# Patient Record
Sex: FEMALE | Race: WHITE | Employment: FULL TIME | URBAN - NONMETROPOLITAN AREA
[De-identification: names, ages, dates, MRNs, and addresses within clinical notes are randomized per-mention and may not be internally consistent; named-entity substitution may affect disease eponyms.]

---

## 2021-07-08 ENCOUNTER — HOSPITAL ENCOUNTER (OUTPATIENT)
Dept: ULTRASOUND IMAGING | Age: 46
Discharge: HOME | End: 2021-07-08

## 2021-07-08 DIAGNOSIS — N85.9 DISORDER OF UTERUS: ICD-10-CM

## 2021-07-08 PROCEDURE — 76830 TRANSVAGINAL US NON-OB: CPT

## 2021-12-30 ENCOUNTER — HOSPITAL ENCOUNTER (OUTPATIENT)
Dept: ULTRASOUND IMAGING | Age: 46
Discharge: HOME | End: 2021-12-30
Payer: COMMERCIAL

## 2021-12-30 DIAGNOSIS — N85.8 UTERINE MASS: ICD-10-CM

## 2021-12-30 PROCEDURE — 76830 TRANSVAGINAL US NON-OB: CPT

## 2024-03-04 ENCOUNTER — OFFICE VISIT (OUTPATIENT)
Dept: ENT CLINIC | Age: 49
End: 2024-03-04
Payer: COMMERCIAL

## 2024-03-04 VITALS — DIASTOLIC BLOOD PRESSURE: 84 MMHG | WEIGHT: 153 LBS | SYSTOLIC BLOOD PRESSURE: 126 MMHG

## 2024-03-04 DIAGNOSIS — Z91.09 ENVIRONMENTAL ALLERGIES: Primary | ICD-10-CM

## 2024-03-04 PROCEDURE — 99203 OFFICE O/P NEW LOW 30 MIN: CPT | Performed by: NURSE PRACTITIONER

## 2024-03-04 RX ORDER — FLUTICASONE PROPIONATE 50 MCG
SPRAY, SUSPENSION (ML) NASAL
COMMUNITY
Start: 2024-02-15

## 2024-03-04 ASSESSMENT — ENCOUNTER SYMPTOMS
ALLERGIC/IMMUNOLOGIC NEGATIVE: 1
EYES NEGATIVE: 1
RESPIRATORY NEGATIVE: 1
GASTROINTESTINAL NEGATIVE: 1

## 2024-03-04 NOTE — PROGRESS NOTES
3/4/2024    Chastity Pepe (:  1975) is a 48 y.o. female, Established patient, here for evaluation of the following chief complaint(s):  New Patient (Sinus )      Vitals:    24 1532   BP: 126/84   Weight: 69.4 kg (153 lb)       Wt Readings from Last 3 Encounters:   24 69.4 kg (153 lb)       BP Readings from Last 3 Encounters:   24 126/84         SUBJECTIVE/OBJECTIVE:    Patient seen today for sinuses.  in room. She states that her PCP gave her a nasal spray 3 weeks ago and it resolved all of her symptoms she just wanted to be examined today to make sure everything was okay. She denies nasal congestion, sinus pressure, and drainage.        Review of Systems   Constitutional: Negative.    HENT: Negative.     Eyes: Negative.    Respiratory: Negative.     Cardiovascular: Negative.    Gastrointestinal: Negative.    Endocrine: Negative.    Musculoskeletal: Negative.    Skin: Negative.    Allergic/Immunologic: Negative.    Neurological: Negative.    Hematological: Negative.    Psychiatric/Behavioral: Negative.          Physical Exam  Vitals reviewed.   Constitutional:       Appearance: Normal appearance. She is normal weight.   HENT:      Head: Normocephalic and atraumatic.      Right Ear: Tympanic membrane, ear canal and external ear normal.      Left Ear: Tympanic membrane, ear canal and external ear normal.      Nose: Nose normal.      Mouth/Throat:      Mouth: Mucous membranes are moist.      Pharynx: Oropharynx is clear.   Eyes:      Extraocular Movements: Extraocular movements intact.      Pupils: Pupils are equal, round, and reactive to light.   Cardiovascular:      Rate and Rhythm: Normal rate and regular rhythm.   Pulmonary:      Effort: Pulmonary effort is normal.      Breath sounds: Normal breath sounds.   Musculoskeletal:      Cervical back: Normal range of motion.   Skin:     General: Skin is warm and dry.   Neurological:      General: No focal deficit present.      Mental

## 2024-12-29 ENCOUNTER — HOSPITAL ENCOUNTER (EMERGENCY)
Age: 49
Discharge: HOME | End: 2024-12-29
Payer: OTHER MISCELLANEOUS

## 2024-12-29 ENCOUNTER — APPOINTMENT (OUTPATIENT)
Dept: GENERAL RADIOLOGY | Age: 49
End: 2024-12-29
Payer: OTHER MISCELLANEOUS

## 2024-12-29 VITALS
HEART RATE: 74 BPM | RESPIRATION RATE: 16 BRPM | SYSTOLIC BLOOD PRESSURE: 144 MMHG | OXYGEN SATURATION: 97 % | TEMPERATURE: 97.8 F | WEIGHT: 145 LBS | DIASTOLIC BLOOD PRESSURE: 92 MMHG

## 2024-12-29 DIAGNOSIS — S92.351A CLOSED DISPLACED FRACTURE OF FIFTH METATARSAL BONE OF RIGHT FOOT, INITIAL ENCOUNTER: ICD-10-CM
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
## 2024-12-29 PROCEDURE — 99283 EMERGENCY DEPT VISIT LOW MDM: CPT

## 2024-12-29 PROCEDURE — 6370000000 HC RX 637 (ALT 250 FOR IP): Performed by: NURSE PRACTITIONER

## 2024-12-29 PROCEDURE — 73610 X-RAY EXAM OF ANKLE: CPT

## 2024-12-29 PROCEDURE — 73630 X-RAY EXAM OF FOOT: CPT

## 2024-12-29 RX ORDER — HYDROCODONE BITARTRATE AND ACETAMINOPHEN 5; 325 MG/1; MG/1
1 TABLET ORAL EVERY 6 HOURS PRN
Qty: 10 TABLET | Refills: 0 | Status: SHIPPED | OUTPATIENT
Start: 2024-12-29 | End: 2025-01-01

## 2024-12-29 RX ORDER — HYDROCODONE BITARTRATE AND ACETAMINOPHEN 7.5; 325 MG/1; MG/1
1 TABLET ORAL ONCE
Status: COMPLETED | OUTPATIENT
Start: 2024-12-29 | End: 2024-12-29

## 2024-12-29 RX ADMIN — HYDROCODONE BITARTRATE AND ACETAMINOPHEN 1 TABLET: 7.5; 325 TABLET ORAL at 19:32
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
## 2025-01-01 ENCOUNTER — APPOINTMENT (OUTPATIENT)
Dept: CT IMAGING | Age: 50
End: 2025-01-01
Payer: COMMERCIAL

## 2025-01-01 ENCOUNTER — APPOINTMENT (OUTPATIENT)
Dept: GENERAL RADIOLOGY | Age: 50
End: 2025-01-01
Payer: COMMERCIAL

## 2025-01-01 ENCOUNTER — HOSPITAL ENCOUNTER (EMERGENCY)
Age: 50
Discharge: HOME | End: 2025-01-01
Attending: EMERGENCY MEDICINE
Payer: COMMERCIAL

## 2025-01-01 VITALS
WEIGHT: 145 LBS | OXYGEN SATURATION: 99 % | DIASTOLIC BLOOD PRESSURE: 82 MMHG | HEART RATE: 55 BPM | TEMPERATURE: 97.6 F | SYSTOLIC BLOOD PRESSURE: 155 MMHG | RESPIRATION RATE: 17 BRPM

## 2025-01-01 DIAGNOSIS — S22.32XA CLOSED FRACTURE OF ONE RIB OF LEFT SIDE, INITIAL ENCOUNTER: Primary | ICD-10-CM

## 2025-01-01 LAB
ALBUMIN SERPL-MCNC: 4.3 G/DL (ref 3.5–5.2)
ALP SERPL-CCNC: 184 U/L (ref 35–104)
ALT SERPL-CCNC: 37 U/L (ref 5–33)
ANION GAP SERPL CALCULATED.3IONS-SCNC: 16 MMOL/L (ref 7–19)
AST SERPL-CCNC: 32 U/L (ref 5–32)
BASOPHILS # BLD: 0.1 K/UL (ref 0–0.2)
BASOPHILS NFR BLD: 1 % (ref 0–1)
BILIRUB SERPL-MCNC: 0.5 MG/DL (ref 0.2–1.2)
BUN SERPL-MCNC: 15 MG/DL (ref 6–20)
CALCIUM SERPL-MCNC: 9 MG/DL (ref 8.6–10)
CHLORIDE SERPL-SCNC: 98 MMOL/L (ref 98–111)
CO2 SERPL-SCNC: 21 MMOL/L (ref 22–29)
CREAT SERPL-MCNC: 0.6 MG/DL (ref 0.5–0.9)
EOSINOPHIL # BLD: 0.1 K/UL (ref 0–0.6)
EOSINOPHIL NFR BLD: 1.7 % (ref 0–5)
ERYTHROCYTE [DISTWIDTH] IN BLOOD BY AUTOMATED COUNT: 12.4 % (ref 11.5–14.5)
GLUCOSE SERPL-MCNC: 318 MG/DL (ref 70–99)
HCT VFR BLD AUTO: 41.3 % (ref 37–47)
HGB BLD-MCNC: 13.9 G/DL (ref 12–16)
IMM GRANULOCYTES # BLD: 0 K/UL
LYMPHOCYTES # BLD: 2.2 K/UL (ref 1.1–4.5)
LYMPHOCYTES NFR BLD: 32.3 % (ref 20–40)
MCH RBC QN AUTO: 30.2 PG (ref 27–31)
MCHC RBC AUTO-ENTMCNC: 33.7 G/DL (ref 33–37)
MCV RBC AUTO: 89.8 FL (ref 81–99)
MONOCYTES # BLD: 0.4 K/UL (ref 0–0.9)
MONOCYTES NFR BLD: 5.9 % (ref 0–10)
NEUTROPHILS # BLD: 4.1 K/UL (ref 1.5–7.5)
NEUTS SEG NFR BLD: 58.7 % (ref 50–65)
PLATELET # BLD AUTO: 174 K/UL (ref 130–400)
PMV BLD AUTO: 12.2 FL (ref 9.4–12.3)
POTASSIUM SERPL-SCNC: 4 MMOL/L (ref 3.5–5)
PROT SERPL-MCNC: 7.4 G/DL (ref 6.4–8.3)
RBC # BLD AUTO: 4.6 M/UL (ref 4.2–5.4)
SODIUM SERPL-SCNC: 135 MMOL/L (ref 136–145)
TROPONIN, HIGH SENSITIVITY: <6 NG/L (ref 0–14)
WBC # BLD AUTO: 6.9 K/UL (ref 4.8–10.8)

## 2025-01-01 PROCEDURE — 84484 ASSAY OF TROPONIN QUANT: CPT

## 2025-01-01 PROCEDURE — 71045 X-RAY EXAM CHEST 1 VIEW: CPT

## 2025-01-01 PROCEDURE — 93005 ELECTROCARDIOGRAM TRACING: CPT | Performed by: EMERGENCY MEDICINE

## 2025-01-01 PROCEDURE — 85025 COMPLETE CBC W/AUTO DIFF WBC: CPT

## 2025-01-01 PROCEDURE — 36415 COLL VENOUS BLD VENIPUNCTURE: CPT

## 2025-01-01 PROCEDURE — 99285 EMERGENCY DEPT VISIT HI MDM: CPT

## 2025-01-01 PROCEDURE — 80053 COMPREHEN METABOLIC PANEL: CPT

## 2025-01-01 PROCEDURE — 6370000000 HC RX 637 (ALT 250 FOR IP): Performed by: EMERGENCY MEDICINE

## 2025-01-01 PROCEDURE — 71250 CT THORAX DX C-: CPT

## 2025-01-01 RX ORDER — HYDROCODONE BITARTRATE AND ACETAMINOPHEN 5; 325 MG/1; MG/1
1 TABLET ORAL EVERY 6 HOURS PRN
Qty: 10 TABLET | Refills: 0 | Status: SHIPPED | OUTPATIENT
Start: 2025-01-01 | End: 2025-01-08

## 2025-01-01 RX ORDER — HYDROCODONE BITARTRATE AND ACETAMINOPHEN 7.5; 325 MG/1; MG/1
1 TABLET ORAL ONCE
Status: COMPLETED | OUTPATIENT
Start: 2025-01-01 | End: 2025-01-01

## 2025-01-01 RX ADMIN — HYDROCODONE BITARTRATE AND ACETAMINOPHEN 1 TABLET: 7.5; 325 TABLET ORAL at 16:18

## 2025-01-01 ASSESSMENT — PAIN SCALES - GENERAL: PAINLEVEL_OUTOF10: 7

## 2025-01-01 NOTE — ED PROVIDER NOTES
[facility] EMERGENCY DEPT  eMERGENCY dEPARTMENT eNCOUnter      Pt Name: Chastity Pepe  MRN: [id number]  Birthdate 1975  Date of evaluation: 1/1/2025  Provider: Raul Torres MD    CHIEF COMPLAINT       Chief Complaint   Patient presents with    Chest Pain         HISTORY OF PRESENT ILLNESS   (Location/Symptom, Timing/Onset,Context/Setting, Quality, Duration, Modifying Factors, Severity)  Note limiting factors.   Chastity Pepe is a 49 y.o. female who presents to the emergency department ***     HPI    NursingNotes were reviewed.    REVIEW OF SYSTEMS    (2-9 systems for level 4, 10 or more for level 5)     Review of Systems         PAST MEDICALHISTORY   History reviewed. No pertinent past medical history.      SURGICAL HISTORY       Past Surgical History:   Procedure Laterality Date    PANCREAS SURGERY  2016    tumor removed         CURRENT MEDICATIONS     Previous Medications    FLUTICASONE (FLONASE) 50 MCG/ACT NASAL SPRAY    USE 1 SPRAY(S) IN EACH NOSTRIL TWICE DAILY    HYDROCODONE-ACETAMINOPHEN (NORCO) 5-325 MG PER TABLET    Take 1 tablet by mouth every 6 hours as needed for Pain for up to 3 days. Max Daily Amount: 4 tablets       ALLERGIES     Patient has no known allergies.    FAMILY HISTORY     History reviewed. No pertinent family history.       SOCIAL HISTORY       Social History     Socioeconomic History    Marital status: Single     Spouse name: None    Number of children: None    Years of education: None    Highest education level: None   Tobacco Use    Smoking status: Never    Smokeless tobacco: Never     Social Determinants of Health      Received from [facility]    Family and Community Support   Intimate Partner Violence: Not At Risk (4/22/2024)    Received from [facility]    Abuse Screen     Feels Unsafe at Home or Work/School: no     Feels Threatened by Someone: no     Does Anyone Try to Keep You From Having Contact with Others or Doing Things Outside Your Home?: no     Physical Signs

## 2025-01-02 LAB
EKG P AXIS: 26 DEGREES
EKG P-R INTERVAL: 134 MS
EKG Q-T INTERVAL: 438 MS
EKG QRS DURATION: 94 MS
EKG QTC CALCULATION (BAZETT): 441 MS
EKG T AXIS: 33 DEGREES

## 2025-01-02 PROCEDURE — 93010 ELECTROCARDIOGRAM REPORT: CPT | Performed by: INTERNAL MEDICINE

## 2025-01-07 ASSESSMENT — ENCOUNTER SYMPTOMS
SHORTNESS OF BREATH: 0
VOMITING: 0
ABDOMINAL PAIN: 0
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
## 2025-01-15 ENCOUNTER — OFFICE VISIT (OUTPATIENT)
Age: 50
End: 2025-01-15
Payer: COMMERCIAL

## 2025-01-15 VITALS — WEIGHT: 145 LBS | BODY MASS INDEX: 24.75 KG/M2 | HEIGHT: 64 IN

## 2025-01-15 DIAGNOSIS — S92.351A CLOSED FRACTURE OF BASE OF FIFTH METATARSAL BONE OF RIGHT FOOT: Primary | ICD-10-CM

## 2025-01-15 PROCEDURE — 99203 OFFICE O/P NEW LOW 30 MIN: CPT

## 2025-01-16 RX ORDER — HYDROCODONE BITARTRATE AND ACETAMINOPHEN 5; 325 MG/1; MG/1
1 TABLET ORAL EVERY 8 HOURS PRN
Qty: 21 TABLET | Refills: 0 | Status: SHIPPED | OUTPATIENT
Start: 2025-01-16 | End: 2025-01-23
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
## 2025-02-05 ENCOUNTER — OFFICE VISIT (OUTPATIENT)
Age: 50
End: 2025-02-05
Payer: COMMERCIAL

## 2025-02-05 VITALS — BODY MASS INDEX: 24.75 KG/M2 | WEIGHT: 145 LBS | HEIGHT: 64 IN

## 2025-02-05 DIAGNOSIS — S92.351D: Primary | ICD-10-CM

## 2025-02-05 PROCEDURE — 99213 OFFICE O/P EST LOW 20 MIN: CPT
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
## 2025-02-26 ENCOUNTER — OFFICE VISIT (OUTPATIENT)
Age: 50
End: 2025-02-26
Payer: COMMERCIAL

## 2025-02-26 VITALS — WEIGHT: 145 LBS | HEIGHT: 64 IN | BODY MASS INDEX: 24.75 KG/M2

## 2025-02-26 DIAGNOSIS — S92.351D: Primary | ICD-10-CM

## 2025-02-26 PROCEDURE — 99213 OFFICE O/P EST LOW 20 MIN: CPT

## 2025-03-21 ENCOUNTER — TELEPHONE (OUTPATIENT)
Age: 50
End: 2025-03-21